# Patient Record
Sex: MALE | Race: WHITE
[De-identification: names, ages, dates, MRNs, and addresses within clinical notes are randomized per-mention and may not be internally consistent; named-entity substitution may affect disease eponyms.]

---

## 2019-02-06 NOTE — HP
PREOPERATIVE HISTORY AND PHYSICAL:

 

DATE OF ADMISSION/SURGERY:  02/19/19 - [facility]

 

DATE OF OFFICE VISIT/ENCOUNTER:  02/06/19

 

ATTENDING SURGEON:  Dayanara Baldwin MD * (DICTATED BY CARROLL RAMON)

 

PROCEDURE:  Right long finger excision mass.

 

CHIEF COMPLAINT:  Mass, right long finger.

 

HISTORY OF PRESENT ILLNESS:  This is a 58-year-old male who works as a 
 at [employer], who complains of a lump on his right long 
finger that has been present for quite some time.  He has had it drained and 
removed in the past, that was about 10 years ago, but it occurred.  He is not 
complaining of any pain. There was no injury there he recalls.  He denies any 
associated numbness or tingling.  He is bothered enough by the mass and he 
would like to have it surgically removed.

 

PAST MEDICAL HISTORY:

1.  Anxiety/depression.

2.  History of pneumonia.

 

PAST SURGICAL HISTORY:

1.  Appendectomy in 1990.

2.  Right long finger excision mass 10 years ago.

 

CURRENT MEDICATIONS:

1.  Adderall 30 mg 3 times a day.

2.  Aripiprazole 15 mg daily.

3.  Fluoxetine HCL 40 mg daily.

4.  Ventolin inhaler 1 to 2 inhalations q.4 hours p.r.n.

5.  Wellbutrin  mg daily.

6.  Wellbutrin  mg daily.

 

ALLERGIES:  No known drug allergies, but he does have allergies to buckwheat 
and walnuts.

 

FAMILY MEDICAL HISTORY:  Cancer and hypertension.

 

SOCIAL HISTORY:  The patient is a substitute .  He denies tobacco 
use, recreational drug use and does not drink alcohol.

 

REVIEW OF SYSTEMS:  Negative for general, cephalic, cardiovascular, respiratory
, GI, , other musculoskeletal, integumentary, endocrine, neurologic, and 
hematologic symptoms.  Infectious Disease:  Negative for MRSA, hepatitis C, HIV.

 

                               PHYSICAL EXAMINATION

 

GENERAL:  Well-developed, well-nourished 58-year-old male, in no acute distress.

 

VITAL SIGNS:  Height 5 feet 9 inches, weight 192 pounds.  Pulse rate 92, blood 
pressure 158/82.

 

HEENT:  Normocephalic, atraumatic.  Pupils are equal, round, and reactive to 
light and accommodation.  Extraocular movements are intact.  Throat is clear.

 

NECK:  Supple.  No palpable lymph nodes.

 

PULMONARY:  Lungs are clear to auscultation bilaterally.  No wheezes, rales, or 
rhonchi.

 

CARDIOVASCULAR:  Regular rate and rhythm.  S1, S2.  No murmurs, rubs, or 
gallops. No edema.

 

ABDOMEN:  Positive bowel sounds, soft, nontender.

 

NEUROLOGICAL:  Alert and oriented x3.  Cranial nerves II through XII are 
intact. Sensation is intact to light touch.

 

MUSCULOSKELETAL:  On exam of his right long finger, there is a soft 
subcutaneous mass just distal to the DIP flexion crease.  It is nontender.  He 
has full range of motion of the finger with flexion and extension.  He has 
normal neurovascular function.

 

IMPRESSION:  Right long finger mass.

 

PLAN:  The patient is scheduled to undergo a right long finger excision mass 
with Dr. Baldwin on 02/19/19.  He will return to the office 10 days postop for 
followup and suture removal.  A prescription for Tylenol with Codeine was e-
scribed to the patient's pharmacy for postoperative pain management.

 

____________________________________ CARROLL RAMON

 

[id number]/CPS #: [id number]

NELLA

## 2019-02-19 ENCOUNTER — HOSPITAL ENCOUNTER (OUTPATIENT)
Dept: HOSPITAL 25 - OREAST | Age: 59
Discharge: HOME | End: 2019-02-19
Attending: ORTHOPAEDIC SURGERY
Payer: MEDICAID

## 2019-02-19 VITALS — DIASTOLIC BLOOD PRESSURE: 83 MMHG | SYSTOLIC BLOOD PRESSURE: 125 MMHG

## 2019-02-19 DIAGNOSIS — D18.01: Primary | ICD-10-CM

## 2019-02-19 DIAGNOSIS — F41.8: ICD-10-CM

## 2019-02-19 PROCEDURE — 88305 TISSUE EXAM BY PATHOLOGIST: CPT

## 2019-02-19 NOTE — OP
DATE OF OPERATION:  02/19/19 [facility]

 

DATE OF BIRTH:  [birth year]

 

SURGEON:  Dr. Baldwin.

 

ASSISTANT:  CARROLL Marcelino.

 

ANESTHESIA:  Local MAC.

 

PRE-OP DIAGNOSIS:  Right long finger mass.

 

POST-OP DIAGNOSIS:  Right long finger mass.

 

OPERATIVE PROCEDURE:  Removal of right long finger mass.

 

ESTIMATED BLOOD LOSS:  Zero.

 

TOURNIQUET TIME:  Approximately 15 minutes.

 

INDICATIONS FOR PROCEDURE:  Kodak is a 58-year-old man who has an enlarging 
mass on the volar aspect of his right middle finger distal to the DIP flexion 
crease. It has a bluish hue to it and appears to be a vascular tumor.  He 
presents for removal.

 

DESCRIPTION OF PROCEDURE:  The patient was brought to the operating room, was 
given a sedation anesthetic and a digital block with 10 cc of 1% plain 
lidocaine.  The skin of his right hand and forearm was prepped and draped in 
the usual sterile fashion.  The middle finger was exsanguinated with a Tourni-
Cot, which was left in place for the duration of the procedure.  A diagonal 
incision was made centered over the mass, and we dissected bluntly through the 
subcutaneous tissue.  Indeed, the mass was a vascular mass which was well 
circumscribed and easily removed and sent for pathology.  The wound was 
irrigated and skin edges reapproximated with 4-0 nylon suture.  The wound was 
dressed with Xeroform, 4x4, Webril, and an Ace wrap. Patient tolerated the 
procedure well and was brought to the recovery room in good condition.

 

 [id number]/CPS #: [id number]

MTDD